# Patient Record
Sex: FEMALE | NOT HISPANIC OR LATINO | Employment: UNEMPLOYED | URBAN - METROPOLITAN AREA
[De-identification: names, ages, dates, MRNs, and addresses within clinical notes are randomized per-mention and may not be internally consistent; named-entity substitution may affect disease eponyms.]

---

## 2018-03-26 ENCOUNTER — HOSPITAL ENCOUNTER (OUTPATIENT)
Dept: PHYSICAL THERAPY | Facility: HOSPITAL | Age: 73
Setting detail: THERAPIES SERIES
Discharge: HOME | End: 2018-03-26

## 2018-03-26 DIAGNOSIS — M25.551 HIP PAIN, BILATERAL: Primary | ICD-10-CM

## 2018-03-26 DIAGNOSIS — M25.552 HIP PAIN, BILATERAL: Primary | ICD-10-CM

## 2018-03-26 PROCEDURE — G8979 MOBILITY GOAL STATUS: HCPCS

## 2018-03-26 PROCEDURE — G8978 MOBILITY CURRENT STATUS: HCPCS

## 2018-03-26 PROCEDURE — 97161 PT EVAL LOW COMPLEX 20 MIN: CPT

## 2018-03-27 ENCOUNTER — TRANSCRIBE ORDERS (OUTPATIENT)
Dept: PHYSICAL THERAPY | Facility: HOSPITAL | Age: 73
End: 2018-03-27

## 2018-03-27 DIAGNOSIS — M16.9 OSTEOARTHRITIS OF HIP, UNSPECIFIED LATERALITY, UNSPECIFIED OSTEOARTHRITIS TYPE: Primary | ICD-10-CM

## 2018-04-10 ENCOUNTER — HOSPITAL ENCOUNTER (OUTPATIENT)
Dept: PHYSICAL THERAPY | Facility: HOSPITAL | Age: 73
Setting detail: THERAPIES SERIES
Discharge: HOME | End: 2018-04-10

## 2018-04-10 DIAGNOSIS — M25.552 HIP PAIN, BILATERAL: Primary | ICD-10-CM

## 2018-04-10 DIAGNOSIS — M25.551 HIP PAIN, BILATERAL: Primary | ICD-10-CM

## 2018-04-10 PROCEDURE — 97110 THERAPEUTIC EXERCISES: CPT

## 2018-04-10 NOTE — THERAPY TREATMENT NOTE
Outpatient Physical Therapy Ortho Treatment Note  [facility]     Patient Name: Dipika Barton  : 1945  MRN: [id number]  Today's Date: 4/10/2018      Visit Date: 04/10/2018    Visit Dx:    ICD-10-CM ICD-9-CM   1. Hip pain, bilateral M25.551 719.45    M25.552        There is no problem list on file for this patient.       No past medical history on file.     No past surgical history on file.          PT Ortho     Row Name 04/10/18 6695       Subjective Comments    Subjective Comments Not been feeling well.  SOA with activity.  Family says she has refused to go to ER, but has appointment with her PCP on Friday.  Wants to review HEP  -LF       Precautions and Contraindications    Precautions Vitals on 4-10-18:  O2 sat 98%, HR 68; BP 98/74  -LF      User Key  (r) = Recorded By, (t) = Taken By, (c) = Cosigned By    Initials Name Provider Type     Otilia Enriquez, PT Physical Therapist                  PT Assessment/Plan     Row Name 04/10/18 2281          PT Assessment    Assessment Comments limited treatment today due to not feeling well. Having some increased LBP when sitting on treatment table and feet not reaching floor, so repositioned to chair and she did better.  Needed increased visual and verbal cues  -LF        PT Plan    PT Plan Comments follow-up in 2 weeks.  Patient/family advised to seek medical attention if worsening symptoms of SOA  -LF       User Key  (r) = Recorded By, (t) = Taken By, (c) = Cosigned By    Initials Name Provider Type     Otilia Enriquez, PT Physical Therapist                    Exercises     Row Name 04/10/18 7675             Subjective Comments    Subjective Comments Not been feeling well.  SOA with activity.  Family says she has refused to go to ER, but has appointment with her PCP on Friday.  Wants to review HEP  -LF         Subjective Pain    Subjective Pain Comment Face scale 2/10 at rest  -LF         Exercise 1    Exercise Name 1 Reviewed and updated HEP.  Patient  completed 2-3 reps each of seated SAQ, hip flex, heel/toe raises, and hip add squeeze.  Also instructed on standing hip abd, ext, and march.  Patient remained seated during treatment and no increased SOA.  she had 2 family members with her today  -LF        User Key  (r) = Recorded By, (t) = Taken By, (c) = Cosigned By    Initials Name Provider Type    LF Otilia Enriquez, PT Physical Therapist              Therapy Education  Given: HEP  Program: Progressed  How Provided: Verbal, Demonstration, Written  Provided to: Patient, Caregiver  Level of Understanding: Teach back education performed              Time Calculation:   Start Time: 1330  Total Timed Code Minutes- PT: 24 minute(s)    Therapy Charges for Today     Code Description Service Date Service Provider Modifiers Qty    09321669713 HC PT THER PROC EA 15 MIN 4/10/2018 Otilia Enriquez, PT GP 2                    Otilia Enriquez, PT  4/10/2018

## 2018-04-23 ENCOUNTER — APPOINTMENT (OUTPATIENT)
Dept: PHYSICAL THERAPY | Facility: HOSPITAL | Age: 73
End: 2018-04-23

## 2018-05-10 ENCOUNTER — DOCUMENTATION (OUTPATIENT)
Dept: PHYSICAL THERAPY | Facility: HOSPITAL | Age: 73
End: 2018-05-10

## 2018-05-10 DIAGNOSIS — M25.552 HIP PAIN, BILATERAL: Primary | ICD-10-CM

## 2018-05-10 DIAGNOSIS — M25.551 HIP PAIN, BILATERAL: Primary | ICD-10-CM

## 2021-09-28 ENCOUNTER — TREATMENT (OUTPATIENT)
Dept: PHYSICAL THERAPY | Facility: CLINIC | Age: 76
End: 2021-09-28

## 2021-09-28 DIAGNOSIS — G89.29 CHRONIC RIGHT SHOULDER PAIN: Primary | ICD-10-CM

## 2021-09-28 DIAGNOSIS — M54.2 PAIN, NECK: ICD-10-CM

## 2021-09-28 DIAGNOSIS — M25.511 CHRONIC RIGHT SHOULDER PAIN: Primary | ICD-10-CM

## 2021-09-28 DIAGNOSIS — M67.911 DYSFUNCTION OF RIGHT ROTATOR CUFF: ICD-10-CM

## 2021-09-28 PROCEDURE — 97110 THERAPEUTIC EXERCISES: CPT | Performed by: PHYSICAL THERAPIST

## 2021-09-28 PROCEDURE — 97162 PT EVAL MOD COMPLEX 30 MIN: CPT | Performed by: PHYSICAL THERAPIST

## 2021-09-28 NOTE — PROGRESS NOTES
Physical Therapy Initial Evaluation and Plan of Care      Patient: Dipika Barton   : 1945  Diagnosis/ICD-10 Code:  Pain, neck [M54.2]  Referring practitioner: Susanne Crowe MD    Subjective Evaluation    History of Present Illness  Mechanism of injury: Has had neck pain off and on for years but worse 1 month ago after a hospital stay for kidney issue. Does get headaches, pain goes into both shoulder R>L. No N/T.       Translation from her daughter    Pain  Current pain ratin  Location: neck to bilateral shoulders and down back   Quality: dull ache, cramping and tight  Aggravating factors: movement, sleeping, lifting, overhead activity and outstretched reach    Hand dominance: right    Patient Goals  Patient goals for therapy: decreased edema, increased motion, return to sport/leisure activities, independence with ADLs/IADLs, increased strength and decreased pain             Objective          Palpation     Right   No palpable tenderness to the sternocleidomastoid. Tenderness of the levator scapulae and upper trapezius.     Tenderness   Cervical Spine   No tenderness in the facet joint.     Additional Tenderness Details  General mild hypomobility     Active Range of Motion   Cervical/Thoracic Spine   Cervical    Left lateral flexion: 35 degrees with pain  Right lateral flexion: 32 degrees with pain  Left rotation: 55 degrees with pain  Right rotation: 55 degrees   Left Shoulder   Flexion: 116 degrees   Abduction: 117 degrees     Right Shoulder   Flexion: 100 degrees with pain  Abduction: 90 degrees with pain  External rotation BTH: C2 with pain  Internal rotation BTB: sacrum with pain    Strength/Myotome Testing     Right Shoulder     Planes of Motion   Flexion: 3+   Abduction: 3+   External rotation at 0°: 4-   Internal rotation at 0°: 4     Tests     Right Shoulder   Positive empty can, full can and Hawkin's.   Negative drop arm.           Assessment & Plan     Assessment  Impairments: abnormal  coordination, abnormal muscle firing, abnormal muscle tone, abnormal or restricted ROM, activity intolerance, impaired physical strength, lacks appropriate home exercise program and pain with function  Assessment details: Patient is a 76 year old female presenting with neck and right shoulder pain that is chronic but worsened following hospital stay. Pain seems to be coming more from shoulder and signs and symptoms are consistent with rotator cuff pathology as well as cervical OA. No neurologic deficits noted at this time. I do not suspect any major muscle tears at this time but could not rule out minor tear. Pain and tenderness is generalized throughout all RTC musculature. She is appropriate for physical therapy to address this issue.   Barriers to therapy: language barrier (tranlated through daughter), limited medical literacy   Prognosis: fair  Prognosis details: Short Term Goals (3 weeks):  1. Patient will be independent with home exercise program.  2. Patient will demonstrate improved shoulder strength by 50%.  3. Patient will demonstrate improved shoulder flexion by 50%.     Long Term Goals (12 weeks):  1. Patient will be able to lift at least 2 lbs to shoulder height with shoulder pain no greater than 2/10.   2. Patient will demonstrate shoulder mobility of WFL.   3. Patient will be able to return to full work duty with shoulder pain no greater than 2/10.    Functional Limitations: carrying objects, lifting, sleeping, pulling, pushing, uncomfortable because of pain, reaching behind back, reaching overhead and unable to perform repetitive tasks  Plan  Therapy options: will be seen for skilled physical therapy services  Planned modality interventions: ultrasound, thermotherapy (hydrocollator packs), TENS, high voltage pulsed current (spasm management), high voltage pulsed current (pain management) and cryotherapy  Planned therapy interventions: therapeutic activities, stretching, strengthening, spinal/joint  mobilization, soft tissue mobilization, postural training, neuromuscular re-education, motor coordination training, manual therapy, abdominal trunk stabilization, ADL retraining, balance/weight-bearing training, joint mobilization, IADL retraining, home exercise program, functional ROM exercises, fine motor coordination training, flexibility and body mechanics training  Frequency: 1-2x/week.  Duration in visits: 16  Treatment plan discussed with: patient        Access Code: [email or web address]  URL: [email or web address]  Date: 09/28/2021  Prepared by: Mojgan Kerr    Exercises  Seated Shoulder Flexion Towel Slide at Table Top - 3 x daily - 7 x weekly - 1 sets - 10 reps  Seated Shoulder Abduction Towel Slide at Table Top - 3 x daily - 7 x weekly - 1 sets - 10 reps  Seated Scapular Retraction - 3 x daily - 7 x weekly - 1 sets - 10 reps        Manual Therapy:         mins  45524;  Therapeutic Exercise:    25     mins  76200;     Neuromuscular Tony:        mins  47830;    Therapeutic Activity:         mins  49592;     Gait Training:           mins  08381;     Ultrasound:          mins  31895;    Electrical Stimulation:        mins  20428 ( );  Dry Needling          mins self-pay    Timed Treatment:   25   mins   Total Treatment:     55   mins    PT SIGNATURE: Mojgan Kerr, PT   DATE TREATMENT INITIATED: 9/28/2021    Medicare Initial Certification  Certification Period: 12/27/2021  I certify that the therapy services are furnished while this patient is under my care.  The services outlined above are required by this patient, and will be reviewed every 90 days.     PHYSICIAN: Susanne Crowe MD      DATE:     Please sign and return via fax to [phone].. Thank you, [facility].

## 2021-10-07 ENCOUNTER — TREATMENT (OUTPATIENT)
Dept: PHYSICAL THERAPY | Facility: CLINIC | Age: 76
End: 2021-10-07

## 2021-10-07 DIAGNOSIS — M25.511 CHRONIC RIGHT SHOULDER PAIN: Primary | ICD-10-CM

## 2021-10-07 DIAGNOSIS — G89.29 CHRONIC RIGHT SHOULDER PAIN: Primary | ICD-10-CM

## 2021-10-07 DIAGNOSIS — M54.2 PAIN, NECK: ICD-10-CM

## 2021-10-07 PROCEDURE — 97110 THERAPEUTIC EXERCISES: CPT | Performed by: PHYSICAL THERAPIST

## 2021-10-07 PROCEDURE — 97140 MANUAL THERAPY 1/> REGIONS: CPT | Performed by: PHYSICAL THERAPIST

## 2021-10-07 NOTE — PROGRESS NOTES
Physical Therapy Daily Progress Note        Patient: Dipika Barton   : 1945  Diagnosis/ICD-10 Code:  Chronic right shoulder pain [M25.511, G89.29]  Referring practitioner: Susanne Crowe MD  Date of Initial Visit: Type: THERAPY  Noted: 2021  Today's Date: 10/7/2021  Patient seen for 2 sessions         Translation from daughter.    Subjective   Dipika Barton reports: no pain at rest after working on the exercises.     Objective          Palpation     Right Tenderness of the biceps, infraspinatus, middle deltoid, supraspinatus and upper trapezius.       R shoulder flexion: 140 deg    See Exercise, Manual, and Modality Logs for complete treatment.       Assessment/Plan  Pt is improving in mobility of the shoulder with stretching and exercise. Manual STM did alleviate lingering pain to allow more AROM pain free.   Progress per Plan of Care and Progress strengthening /stabilization /functional activity           Timed:  Manual Therapy:    15     mins  86735;  Therapeutic Exercise:    30     mins  35499;     Neuromuscular Tony:        mins  18942;    Therapeutic Activity:          mins  31317;     Gait Training:           mins  92485;     Ultrasound:          mins  08039;    Electrical Stimulation:         mins  29139;  Iontophoresis          mins  48398    Untimed:  Electrical Stimulation:         mins  05746 ( );  Mechanical Traction:         mins  13269;   Fluidotherapy          mins  46016    Timed Treatment:   45   mins   Total Treatment:     45   mins        Ayla Sher PTA  Physical Therapist Assistant

## 2021-10-08 ENCOUNTER — TREATMENT (OUTPATIENT)
Dept: PHYSICAL THERAPY | Facility: CLINIC | Age: 76
End: 2021-10-08

## 2021-10-08 DIAGNOSIS — M54.2 PAIN, NECK: ICD-10-CM

## 2021-10-08 DIAGNOSIS — M25.511 CHRONIC RIGHT SHOULDER PAIN: Primary | ICD-10-CM

## 2021-10-08 DIAGNOSIS — G89.29 CHRONIC RIGHT SHOULDER PAIN: Primary | ICD-10-CM

## 2021-10-08 DIAGNOSIS — M67.911 DYSFUNCTION OF RIGHT ROTATOR CUFF: ICD-10-CM

## 2021-10-08 PROCEDURE — 97035 APP MDLTY 1+ULTRASOUND EA 15: CPT | Performed by: PHYSICAL THERAPIST

## 2021-10-08 PROCEDURE — 97140 MANUAL THERAPY 1/> REGIONS: CPT | Performed by: PHYSICAL THERAPIST

## 2021-10-08 PROCEDURE — 97110 THERAPEUTIC EXERCISES: CPT | Performed by: PHYSICAL THERAPIST

## 2021-10-08 NOTE — PROGRESS NOTES
Visit #: 3    Subjective   Dipika Barton reports: shoulder is a little painful today   Translated from daughter     Objective   V/T cues for supine<>sit.     See Exercise, Manual, and Modality Logs for complete treatment.       Assessment/Plan  Encouraging patient into bed mobility on her own with minimal assistance, have to remind daughter to let patient do as much as she can. Will continue shoulder mobility, endurance is poor.    Progress per Plan of Care           Manual Therapy:    15     mins  49357;  Therapeutic Exercise:    30     mins  89708;     Neuromuscular Tony:        mins  90705;    Therapeutic Activity:          mins  55448;     Gait Training:          mins  27982;     Ultrasound:     13     mins  10849;   Iontophoresis          mins  40295   Electrical Stimulation:        mins  08878 ( );  Dry Needling          mins self-pay  Fluidotherapy         mins 35656    Timed Treatment:   58   mins   Total Treatment:     58   mins    Mojgan Kerr, PT  Physical Therapist

## 2021-10-12 ENCOUNTER — TREATMENT (OUTPATIENT)
Dept: PHYSICAL THERAPY | Facility: CLINIC | Age: 76
End: 2021-10-12

## 2021-10-12 DIAGNOSIS — G89.29 CHRONIC RIGHT SHOULDER PAIN: Primary | ICD-10-CM

## 2021-10-12 DIAGNOSIS — M54.2 PAIN, NECK: ICD-10-CM

## 2021-10-12 DIAGNOSIS — M25.511 CHRONIC RIGHT SHOULDER PAIN: Primary | ICD-10-CM

## 2021-10-12 PROCEDURE — 97140 MANUAL THERAPY 1/> REGIONS: CPT | Performed by: PHYSICAL THERAPIST

## 2021-10-12 PROCEDURE — 97110 THERAPEUTIC EXERCISES: CPT | Performed by: PHYSICAL THERAPIST

## 2021-10-12 NOTE — PROGRESS NOTES
Physical Therapy Daily Progress Note        Patient: Dipika Barton   : 1945  Diagnosis/ICD-10 Code:  Chronic right shoulder pain [M25.511, G89.29]  Referring practitioner: Susanne Crowe MD  Date of Initial Visit: Type: THERAPY  Noted: 2021  Today's Date: 10/12/2021  Patient seen for 4 sessions             Subjective   Dipika Barton reports: unable to get up on her own from the bed. She notes feeling better with small movements that do not cause pain. Lifting overhead and lying on the R shoulder hurt the most. Daughter translating for patient.     Objective   TENDER: R UT  See Exercise, Manual, and Modality Logs for complete treatment.       Assessment/Plan  Pain is localizing more at the top of the shoulder. Went through best way to get out of bed with some assistance by rolling onto left side and dropping feet off side of bed while daughter minimally assists to seated position.   Progress per Plan of Care and Progress strengthening /stabilization /functional activity           Timed:  Manual Therapy:    12     mins  01998;  Therapeutic Exercise:    30     mins  77366;     Neuromuscular Tony:        mins  06364;    Therapeutic Activity:          mins  91300;     Gait Training:           mins  83478;     Ultrasound:          mins  87875;    Electrical Stimulation:         mins  32314;  Iontophoresis          mins  83943    Untimed:  Electrical Stimulation:         mins  24400 ( );  Mechanical Traction:         mins  82973;   Fluidotherapy          mins  09067    Timed Treatment:   42   mins   Total Treatment:     55   mins        Ayla Sher PTA  Physical Therapist Assistant

## 2021-10-14 ENCOUNTER — TREATMENT (OUTPATIENT)
Dept: PHYSICAL THERAPY | Facility: CLINIC | Age: 76
End: 2021-10-14

## 2021-10-14 DIAGNOSIS — M67.911 DYSFUNCTION OF RIGHT ROTATOR CUFF: ICD-10-CM

## 2021-10-14 DIAGNOSIS — M54.2 PAIN, NECK: ICD-10-CM

## 2021-10-14 DIAGNOSIS — G89.29 CHRONIC RIGHT SHOULDER PAIN: Primary | ICD-10-CM

## 2021-10-14 DIAGNOSIS — M25.511 CHRONIC RIGHT SHOULDER PAIN: Primary | ICD-10-CM

## 2021-10-14 PROCEDURE — 97110 THERAPEUTIC EXERCISES: CPT | Performed by: PHYSICAL THERAPIST

## 2021-10-14 PROCEDURE — 97140 MANUAL THERAPY 1/> REGIONS: CPT | Performed by: PHYSICAL THERAPIST

## 2021-10-14 NOTE — PROGRESS NOTES
Physical Therapy Daily Progress Note        Patient: Dipika Barton   : 1945  Diagnosis/ICD-10 Code:  Chronic right shoulder pain [M25.511, G89.29]  Referring practitioner: Susanne Crowe MD  Date of Initial Visit: Type: THERAPY  Noted: 2021  Today's Date: 10/14/2021  Patient seen for 5 sessions             Subjective   Dipika Barton reports: going to be getting kidney stone removed on Monday. She has been dealing with the stone for two months and the catheter did not allow it to pass. She would like to practice the getting up out of bed again today due to not being able to use her R shoulder. Translated by daughter.     Objective   R abduction: 145 deg  Flexion: 140 deg  See Exercise, Manual, and Modality Logs for complete treatment.       Assessment/Plan  Pt was advised to take it easy next week and focus on the HEP as tolerated until she is able to return post procedure unrelated to shoulder. Did not complete all exercises today due to energy level. Pt was able to get up from supine position with minimal help using wedge.   Progress per Plan of Care and Progress strengthening /stabilization /functional activity           Timed:  Manual Therapy:    15     mins  02689;  Therapeutic Exercise:    30     mins  17962;     Neuromuscular Tony:        mins  56039;    Therapeutic Activity:          mins  97088;     Gait Training:           mins  55293;     Ultrasound:          mins  42280;    Electrical Stimulation:         mins  32691;  Iontophoresis          mins  52471    Untimed:  Electrical Stimulation:         mins  19312 ( );  Mechanical Traction:         mins  28843;   Fluidotherapy          mins  72512    Timed Treatment:   45   mins   Total Treatment:     45   mins        Ayla Sher PTA  Physical Therapist Assistant

## 2023-05-02 NOTE — THERAPY EVALUATION
Outpatient Physical Therapy Ortho Initial Evaluation   [place]     Patient Name: Dipika Barton  : 1945  MRN: [id number]  Today's Date: 3/27/2018      Visit Date: 2018    There is no problem list on file for this patient.       No past medical history on file.     No past surgical history on file.    Visit Dx:     ICD-10-CM ICD-9-CM   1. Hip pain, bilateral M25.551 719.45    M25.552              Patient History     Row Name 18 1600             History    Chief Complaint Pain  -LF      Type of Pain Back pain;Hip pain  -LF      Date Current Problem(s) Began --   may 2016  -LF      Brief Description of Current Complaint Presents with bilateral hip pain that began several years ago, but worsened since May 2017.  Says pain back part of her hips, and top of her legs are tight.  Difficulty standing, especially after sitting for a while  Has h/o shoulder pain and had physical therapy for this.  Began about May last year.   (meds:   xerelto, pravastatin, metraprolol, verapamil, escitalopram, alendronate  -LF      What clinical tests have you had for this problem? X-ray  -LF      Results of Clinical Tests OA of hips  -LF         Pain     Pain Location Hip  -LF      Pain at Present 0  -LF      Pain at Best 0  -LF      Pain at Worst 6  -LF      Pain Frequency Constant/continuous  -LF      What Performance Factors Make the Current Problem(s) WORSE? extreme pain in low back with sit>stand transition; standing >10 minutes; lifting  -LF      What Performance Factors Make the Current Problem(s) BETTER? sitting; oils; topical; acetaminphen  -LF      What position do you sleep in? Right sidelying   can't tolerate supine or L sidelying  -LF      Difficulties with recreational activities? family says she prays several times a day.  3 positions and she is no longer able to kneel, nor stand and lean forward.  She has to sit for this part of her prayer  -LF         Fall Risk Assessment    Any falls in the past  year: No  -LF         Daily Activities    Primary Language Turkish  -LF      Action taken if English not primary language grandson present to interpret  -LF      How does patient learn best? Reading;Listening;Demonstration  -LF      Teaching needs identified Home Exercise Program;Management of Condition  -LF      Barriers to learning Language;Other (comment)   cultural - female only. Remain in treatment room for exercis  -LF      Action taken for identified issues offered hospital interpreting services  -LF      Pt Participated in POC and Goals Yes   daughter and grandson present  -LF        User Key  (r) = Recorded By, (t) = Taken By, (c) = Cosigned By    Initials Name Provider Type    LF Otilia Enriquez, PT Physical Therapist                PT Ortho     Row Name 03/26/18 1600       Posture/Observations    Observations --   slow and guarded sit<>stand; supine<>sit  -LF    Posture/Observations Comments Palpation:  tender along bilateral lower thoracic and lumbar spine, gluteal regions and posterior to greater trochanter, but not over GT  -LF       Special Tests/Palpation    Special Tests/Palpation --   unable to tolerate positioning for special tests  -LF       MMT (Manual Muscle Testing)    Additional Documentation General Assessment (Manual Muscle Testing) (Group)  -LF       General Assessment (Manual Muscle Testing)    General Manual Muscle Testing (MMT) Assessment lower extremity strength deficits identified  -LF       Lower Extremity (Manual Muscle Testing)    Lower Extremity: Manual Muscle Testing (MMT) left hip strength deficit;right hip strength deficit;left knee strength deficit;right knee strength deficit   WNL's at ankle  -LF       Left Hip (Manual Muscle Testing)    Left Hip Manual Muscle Testing (MMT) flexion;abduction;adduction  -LF    MMT: Flexion, Left Hip flexion  -LF    MMT, Gross Movement: Left Hip Flexion (4-/5) good minus  -LF    MMT: ABduction, Left Hip abduction  -LF    MMT, Gross Movement:  "Left Hip ABduction (4-/5) good minus  -LF    MMT, Gross Movement: Left Hip ADduction (4/5) good  -LF       Right Hip (Manual Muscle Testing)    Right Hip Manual Muscle Testing (MMT) flexion;abduction;adduction  -LF    MMT: Flexion, Right Hip flexion  -LF    MMT, Gross Movement: Right Hip Flexion (4-/5) good minus  -LF    MMT: ABduction, Right Hip abduction  -LF    MMT, Gross Movement: Right Hip ABduction (4-/5) good minus  -LF    MMT, Gross Movement: Right Hip ADduction (4/5) good  -LF       Left Knee (Manual Muscle Testing)    Left Knee Manual Muscle Testing (MMT) extension;flexion  -LF    MMT: Extension, Left Knee extension  -LF    MMT, Gross Movement: Left Knee Extension (5/5) normal  -LF    MMT: Flexion, Left Knee flexion  -LF    MMT, Gross Movement: Left Knee Flexion (4/5) good  -LF    Comment, Left Knee: Manual Muscle Testing (MMT) increased LE pain with MMT  -LF       Right Knee (Manual Muscle Testing)    Right Knee Manual Muscle Testing (MMT) extension;flexion  -LF    MMT: Extension, Right Knee extension  -LF    MMT, Gross Movement: Right Knee Extension (5/5) normal  -LF    MMT: Flexion, Right Knee flexion  -LF    MMT, Gross Movement: Right Knee Flexion (4/5) good  -LF    Comment, Right Knee: Manual Muscle Testing (MMT) increased LE pain with MMT  -LF       Transfers    Comment (Transfers) slow to transition sit<>stand and requires B UE use  -LF       Gait/Stairs Assessment/Training    Comment (Gait/Stairs) unsteady gait.  Family reports she declines use of A.D.  \"furniture walks\" and holds to family member for support  -LF      User Key  (r) = Recorded By, (t) = Taken By, (c) = Cosigned By    Initials Name Provider Type    LF Otilia Enriquez, PT Physical Therapist                      Therapy Education  Education Details: educated on seated LAQ and hip flexion which she says she already does.  Also seat trunk flex stretch with arms resting on table.  Given: Symptoms/condition management  Program: New  How " Provided: Verbal, Demonstration  Provided to: Patient, Caregiver  Level of Understanding: Verbalized           PT OP Goals     Row Name 03/26/18 1600          PT Short Term Goals    STG Date to Achieve 04/23/18  -LF     STG 1 Patient to report 25% improvement in back/hip pain  -LF        Long Term Goals    LTG Date to Achieve 05/07/18  -LF     LTG 1 Patient able to ability to stand >/= 10 min with mild to no increase in pain  -LF     LTG 2 Patient to report 50% less pain with sit<> stand transition  -LF     LTG 3 Patient independent and compliant with HEP for management of symptoms  -LF        Time Calculation    PT Goal Re-Cert Due Date 06/24/18  -LF       User Key  (r) = Recorded By, (t) = Taken By, (c) = Cosigned By    Initials Name Provider Type    LF Otilia Enriquez, PT Physical Therapist                PT Assessment/Plan     Row Name 03/26/18 1600          PT Assessment    Functional Limitations Limitation in home management;Impaired gait;Performance in leisure activities;Performance in self-care ADL  -LF     Impairments Gait;Pain;Muscle strength;Posture;Range of motion;Impaired flexibility  -LF     Assessment Comments Patient presents with evolving signs and symptoms of moderate complexity.  She has heart issues that limit or activity tolerance and positioning, and B shoulder pain and limited ROM.  Pain primarily posterior hips, low back, and radiates into legs.  Further treatment indicated to help reduce pain, address deficits, and assist with long-term self-management of symptoms.  -LF     Please refer to paper survey for additional self-reported information Yes  -LF     Rehab Potential Fair  -LF     Patient/caregiver participated in establishment of treatment plan and goals Yes  -LF     Patient would benefit from skilled therapy intervention Yes  -LF        PT Plan    PT Frequency 1x/week  -LF     Predicted Duration of Therapy Intervention (OT Eval) 3-4 visits  -LF     Planned CPT's? PT EVAL MOD  COMPLELITY: 28631;PT THER PROC EA 15 MIN: 84367;PT MANUAL THERAPY EA 15 MIN: 37667;PT HOT OR COLD PACK TREAT MCARE;PT ELECTRICAL STIM UNATTEND: ;PT ULTRASOUND EA 15 MIN: 78610  -LF     PT Plan Comments follow-up in 2 weeks per family request.  Treatment to focus on progression of HEP for gentle core stability, LE strength, and flexibility  -LF       User Key  (r) = Recorded By, (t) = Taken By, (c) = Cosigned By    Initials Name Provider Type     Otilia Enriquez, PIREO Physical Therapist              Outcome Measure Options: Lower Extremity Functional Scale (LEFS)  Lower Extremity Functional Index  Any of your usual work, housework or school activities: Moderate difficulty  Your usual hobbies, recreational or sporting activities: Quite a bit of difficulty  Getting into or out of the bath: Quite a bit of difficulty  Walking between rooms: A little bit of difficulty  Putting on your shoes or socks: A little bit of difficulty  Squatting: Extreme difficulty or unable to perform activity  Lifting an object, like a bag of groceries from the floor: Moderate difficulty  Performing light activities around your home: A little bit of difficulty  Performing heavy activities around your home: Extreme difficulty or unable to perform activity  Getting into or out of a car: Moderate difficulty  Walking 2 blocks: Extreme difficulty or unable to perform activity  Walking a mile: Extreme difficulty or unable to perform activity  Going up or down 10 stairs (about 1 flight of stairs): Quite a bit of difficulty  Standing for 1 hour: Extreme difficulty or unable to perform activity  Sitting for 1 hour: Moderate difficulty  Running on even ground: Extreme difficulty or unable to perform activity  Running on uneven ground: Extreme difficulty or unable to perform activity  Making sharp turns while running fast: Extreme difficulty or unable to perform activity  Hopping: Extreme difficulty or unable to perform activity  Rolling over in  bed: Moderate difficulty  Total: 22      Time Calculation:         Therapy Charges for Today     Code Description Service Date Service Provider Modifiers Qty    89566083415 HC PT MOBILITY CURRENT 3/26/2018 Otilia Enriquez, PT GP, CL 1    00141021902 HC PT MOBILITY PROJECTED 3/26/2018 Otilia Enriquez, PT GP, CK 1    21465263679 HC PT EVAL LOW COMPLEXITY 4 3/26/2018 Otilia Enriquez, PT GP 1          PT G-Codes  Outcome Measure Options: Lower Extremity Functional Scale (LEFS)  Functional Limitation: Mobility: Walking and moving around  Mobility: Walking and Moving Around Current Status (): At least 60 percent but less than 80 percent impaired, limited or restricted  Mobility: Walking and Moving Around Goal Status (): At least 40 percent but less than 60 percent impaired, limited or restricted         Otilia Enriquez, PT  3/27/2018       none